# Patient Record
Sex: MALE | Race: WHITE | NOT HISPANIC OR LATINO | Employment: OTHER | URBAN - METROPOLITAN AREA
[De-identification: names, ages, dates, MRNs, and addresses within clinical notes are randomized per-mention and may not be internally consistent; named-entity substitution may affect disease eponyms.]

---

## 2021-05-17 ENCOUNTER — COMMUNICATION - HEALTHEAST (OUTPATIENT)
Dept: SCHEDULING | Facility: CLINIC | Age: 61
End: 2021-05-17

## 2021-05-25 ENCOUNTER — RECORDS - HEALTHEAST (OUTPATIENT)
Dept: ADMINISTRATIVE | Facility: CLINIC | Age: 61
End: 2021-05-25

## 2021-05-26 ENCOUNTER — RECORDS - HEALTHEAST (OUTPATIENT)
Dept: ADMINISTRATIVE | Facility: CLINIC | Age: 61
End: 2021-05-26

## 2021-05-29 ENCOUNTER — RECORDS - HEALTHEAST (OUTPATIENT)
Dept: ADMINISTRATIVE | Facility: CLINIC | Age: 61
End: 2021-05-29

## 2021-06-02 ENCOUNTER — RECORDS - HEALTHEAST (OUTPATIENT)
Dept: ADMINISTRATIVE | Facility: CLINIC | Age: 61
End: 2021-06-02

## 2021-06-09 ENCOUNTER — RECORDS - HEALTHEAST (OUTPATIENT)
Dept: ADMINISTRATIVE | Facility: CLINIC | Age: 61
End: 2021-06-09

## 2021-06-16 PROBLEM — I63.9 ACUTE CEREBROVASCULAR ACCIDENT (CVA) (H): Status: ACTIVE | Noted: 2021-05-17

## 2021-06-16 PROBLEM — I63.9 ACUTE ISCHEMIC STROKE (H): Status: ACTIVE | Noted: 2021-05-16

## 2024-02-22 ENCOUNTER — APPOINTMENT (OUTPATIENT)
Dept: CT IMAGING | Facility: CLINIC | Age: 64
End: 2024-02-22
Attending: EMERGENCY MEDICINE
Payer: COMMERCIAL

## 2024-02-22 ENCOUNTER — HOSPITAL ENCOUNTER (EMERGENCY)
Facility: CLINIC | Age: 64
Discharge: HOME | End: 2024-02-23
Attending: EMERGENCY MEDICINE | Admitting: EMERGENCY MEDICINE
Payer: COMMERCIAL

## 2024-02-22 DIAGNOSIS — R51.9 ACUTE INTRACTABLE HEADACHE, UNSPECIFIED HEADACHE TYPE: ICD-10-CM

## 2024-02-22 LAB
ANION GAP SERPL CALCULATED.3IONS-SCNC: 10 MMOL/L (ref 7–15)
BASOPHILS # BLD AUTO: 0 10E3/UL (ref 0–0.2)
BASOPHILS NFR BLD AUTO: 0 %
BUN SERPL-MCNC: 17.6 MG/DL (ref 8–23)
CALCIUM SERPL-MCNC: 9.6 MG/DL (ref 8.8–10.2)
CHLORIDE SERPL-SCNC: 102 MMOL/L (ref 98–107)
CREAT SERPL-MCNC: 0.93 MG/DL (ref 0.67–1.17)
DEPRECATED HCO3 PLAS-SCNC: 24 MMOL/L (ref 22–29)
EGFRCR SERPLBLD CKD-EPI 2021: >90 ML/MIN/1.73M2
EOSINOPHIL # BLD AUTO: 0.1 10E3/UL (ref 0–0.7)
EOSINOPHIL NFR BLD AUTO: 1 %
ERYTHROCYTE [DISTWIDTH] IN BLOOD BY AUTOMATED COUNT: 13.2 % (ref 10–15)
GLUCOSE SERPL-MCNC: 158 MG/DL (ref 70–99)
HCT VFR BLD AUTO: 33.8 % (ref 40–53)
HGB BLD-MCNC: 11.3 G/DL (ref 13.3–17.7)
IMM GRANULOCYTES # BLD: 0 10E3/UL
IMM GRANULOCYTES NFR BLD: 0 %
LYMPHOCYTES # BLD AUTO: 1 10E3/UL (ref 0.8–5.3)
LYMPHOCYTES NFR BLD AUTO: 14 %
MAGNESIUM SERPL-MCNC: 2.2 MG/DL (ref 1.7–2.3)
MCH RBC QN AUTO: 30.7 PG (ref 26.5–33)
MCHC RBC AUTO-ENTMCNC: 33.4 G/DL (ref 31.5–36.5)
MCV RBC AUTO: 92 FL (ref 78–100)
MONOCYTES # BLD AUTO: 0.8 10E3/UL (ref 0–1.3)
MONOCYTES NFR BLD AUTO: 10 %
NEUTROPHILS # BLD AUTO: 5.5 10E3/UL (ref 1.6–8.3)
NEUTROPHILS NFR BLD AUTO: 75 %
NRBC # BLD AUTO: 0 10E3/UL
NRBC BLD AUTO-RTO: 0 /100
PLATELET # BLD AUTO: 182 10E3/UL (ref 150–450)
POTASSIUM SERPL-SCNC: 4.2 MMOL/L (ref 3.4–5.3)
RBC # BLD AUTO: 3.68 10E6/UL (ref 4.4–5.9)
SODIUM SERPL-SCNC: 136 MMOL/L (ref 135–145)
WBC # BLD AUTO: 7.4 10E3/UL (ref 4–11)

## 2024-02-22 PROCEDURE — 258N000003 HC RX IP 258 OP 636: Performed by: EMERGENCY MEDICINE

## 2024-02-22 PROCEDURE — 70496 CT ANGIOGRAPHY HEAD: CPT

## 2024-02-22 PROCEDURE — 250N000011 HC RX IP 250 OP 636: Performed by: EMERGENCY MEDICINE

## 2024-02-22 PROCEDURE — 96361 HYDRATE IV INFUSION ADD-ON: CPT | Mod: 59

## 2024-02-22 PROCEDURE — 85004 AUTOMATED DIFF WBC COUNT: CPT | Performed by: EMERGENCY MEDICINE

## 2024-02-22 PROCEDURE — 99285 EMERGENCY DEPT VISIT HI MDM: CPT | Mod: 25

## 2024-02-22 PROCEDURE — 250N000013 HC RX MED GY IP 250 OP 250 PS 637: Performed by: EMERGENCY MEDICINE

## 2024-02-22 PROCEDURE — 83735 ASSAY OF MAGNESIUM: CPT | Performed by: EMERGENCY MEDICINE

## 2024-02-22 PROCEDURE — 36415 COLL VENOUS BLD VENIPUNCTURE: CPT | Performed by: EMERGENCY MEDICINE

## 2024-02-22 PROCEDURE — 72125 CT NECK SPINE W/O DYE: CPT

## 2024-02-22 PROCEDURE — 80048 BASIC METABOLIC PNL TOTAL CA: CPT | Performed by: EMERGENCY MEDICINE

## 2024-02-22 RX ORDER — DIPHENHYDRAMINE HYDROCHLORIDE 50 MG/ML
25 INJECTION INTRAMUSCULAR; INTRAVENOUS ONCE
Status: COMPLETED | OUTPATIENT
Start: 2024-02-22 | End: 2024-02-23

## 2024-02-22 RX ORDER — OXYCODONE HYDROCHLORIDE 5 MG/1
5 TABLET ORAL ONCE
Status: COMPLETED | OUTPATIENT
Start: 2024-02-22 | End: 2024-02-22

## 2024-02-22 RX ORDER — METOCLOPRAMIDE HYDROCHLORIDE 5 MG/ML
10 INJECTION INTRAMUSCULAR; INTRAVENOUS ONCE
Status: COMPLETED | OUTPATIENT
Start: 2024-02-22 | End: 2024-02-23

## 2024-02-22 RX ORDER — ACETAMINOPHEN 325 MG/1
975 TABLET ORAL ONCE
Status: COMPLETED | OUTPATIENT
Start: 2024-02-22 | End: 2024-02-22

## 2024-02-22 RX ORDER — IOPAMIDOL 755 MG/ML
75 INJECTION, SOLUTION INTRAVASCULAR ONCE
Status: COMPLETED | OUTPATIENT
Start: 2024-02-23 | End: 2024-02-22

## 2024-02-22 RX ADMIN — SODIUM CHLORIDE 500 ML: 9 INJECTION, SOLUTION INTRAVENOUS at 23:57

## 2024-02-22 RX ADMIN — ACETAMINOPHEN 975 MG: 325 TABLET ORAL at 23:58

## 2024-02-22 RX ADMIN — OXYCODONE HYDROCHLORIDE 5 MG: 5 TABLET ORAL at 23:58

## 2024-02-22 RX ADMIN — IOPAMIDOL 75 ML: 755 INJECTION, SOLUTION INTRAVENOUS at 23:46

## 2024-02-22 ASSESSMENT — ENCOUNTER SYMPTOMS
LIGHT-HEADEDNESS: 0
FACIAL ASYMMETRY: 0
SHORTNESS OF BREATH: 0
DYSURIA: 0
NAUSEA: 1
WEAKNESS: 0
BACK PAIN: 0
HEADACHES: 1
FEVER: 0
NECK PAIN: 1
VOMITING: 0
COUGH: 0
DIZZINESS: 0
NUMBNESS: 0
DIARRHEA: 0
ABDOMINAL PAIN: 0

## 2024-02-22 NOTE — Clinical Note
Neftali Steinberg was seen and treated in our emergency department on 2/22/2024.  He may return to work on 02/26/2024.       If you have any questions or concerns, please don't hesitate to call.      Omaira Baker MD

## 2024-02-23 VITALS
OXYGEN SATURATION: 94 % | SYSTOLIC BLOOD PRESSURE: 120 MMHG | TEMPERATURE: 97 F | HEART RATE: 53 BPM | WEIGHT: 222 LBS | DIASTOLIC BLOOD PRESSURE: 66 MMHG | RESPIRATION RATE: 16 BRPM | BODY MASS INDEX: 33.75 KG/M2

## 2024-02-23 LAB
HOLD SPECIMEN: NORMAL

## 2024-02-23 PROCEDURE — 250N000011 HC RX IP 250 OP 636: Performed by: EMERGENCY MEDICINE

## 2024-02-23 PROCEDURE — 96375 TX/PRO/DX INJ NEW DRUG ADDON: CPT

## 2024-02-23 PROCEDURE — 96374 THER/PROPH/DIAG INJ IV PUSH: CPT

## 2024-02-23 RX ORDER — DEXAMETHASONE SODIUM PHOSPHATE 10 MG/ML
10 INJECTION, SOLUTION INTRAMUSCULAR; INTRAVENOUS ONCE
Status: COMPLETED | OUTPATIENT
Start: 2024-02-23 | End: 2024-02-23

## 2024-02-23 RX ORDER — ONDANSETRON 4 MG/1
4 TABLET, ORALLY DISINTEGRATING ORAL EVERY 8 HOURS PRN
Qty: 10 TABLET | Refills: 0 | Status: SHIPPED | OUTPATIENT
Start: 2024-02-23

## 2024-02-23 RX ADMIN — METOCLOPRAMIDE HYDROCHLORIDE 10 MG: 5 INJECTION INTRAMUSCULAR; INTRAVENOUS at 00:03

## 2024-02-23 RX ADMIN — DIPHENHYDRAMINE HYDROCHLORIDE 25 MG: 50 INJECTION INTRAMUSCULAR; INTRAVENOUS at 00:01

## 2024-02-23 RX ADMIN — DEXAMETHASONE SODIUM PHOSPHATE 10 MG: 10 INJECTION INTRAMUSCULAR; INTRAVENOUS at 01:37

## 2024-02-23 NOTE — ED TRIAGE NOTES
Patient presents to the ED with a headache since Monday. He has been taking ibuprofen without relief. Patient states that he has been getting a sharp shooting pain in his head when he turns his neck a certain direction. Patient reports nausea with the pain. Denies any changes in vision.

## 2024-02-23 NOTE — ED PROVIDER NOTES
EMERGENCY DEPARTMENT ENCOUNTER      NAME: Neftali Steinberg  AGE: 64 year old male  YOB: 1960  MRN: [id number]  EVALUATION DATE & TIME: No admission date for patient encounter.    PCP: Hermila Mckee    ED PROVIDER: Omaira Baker M.D.        Chief Complaint   Patient presents with    Headache         FINAL IMPRESSION:    1. Acute intractable headache, unspecified headache type            MEDICAL DECISION MAKIN year old male with history of stroke back in  who presents emergency department with headache.  He describes the headache as being on the top of his head and even is experiencing some hyperesthesias to the scalp.  This has been going on now for several days.  Neuroexam completely normal.  Laboratories and CT imaging unremarkable.  Scalp exam without any findings for rash, lesions, or other abnormalities.  Nothing to suggest zoster as the pain crosses the midline over his scalp.  Pain is not only external.  He received Reglan, Benadryl, IV fluids, oxycodone with slight improvement in headache.  He was also given a dose of Decadron prior to discharge.  Toradol was offered but patient declined.  No red flags on exam or history at this time.  I do not feel the patient requires emergent admission at this point.  No indication for MRI imaging.  No indication for lumbar puncture.  No reported recent illnesses to suggest risk for developing venous sinus thrombosis.      ED COURSE:  10:50 PM  I met with the patient to gather history and perform my exam. ED course and treatment discussed.    1:38 AM  Updated patient on all results.  He states the headache is still pretty significant though it has improved from arrival.  Neuroexam is completely unremarkable.  Imaging looks great.  Nothing to suggest meningitis or encephalitis.  Considered things like COVID, influenza, RSV.  He has no other symptoms that would be consistent.  He has declined testing for these which I think is reasonable.  He  states he did do a home COVID test that was negative.  No neurologic changes to suggest the need for MRI imaging.  Does not sound like subarachnoid or intracranial hemorrhage type headache.  No red flags on exam or history.  Did discuss with him about trying some Toradol but he declines.  He did agree to try some Decadron before he goes.  Will have him follow-up with primary care early next week if not improving or return to ER if anything worsens.  We discussed what to watch for and when return to the ER and all of his questions have been answered.    At this time I do not think that this represents CVA, TIA, SAH, glaucoma, temporal arteritis, sinus thrombosis, vascular dissection, pseudotumor cerebri, meningitis, enchephalitis, hypertensive urgency or emergency, or other such etiologies.    At the conclusion of the encounter I discussed the results of all of the tests and the disposition. Their questions were answered. The patient (and any family present) acknowledged understanding and were agreeable with the care plan.      CONSULTANTS:  none    MEDICATIONS GIVEN IN THE EMERGENCY:  Medications   oxyCODONE (ROXICODONE) tablet 5 mg (5 mg Oral $Given 2/22/24 2358)   acetaminophen (TYLENOL) tablet 975 mg (975 mg Oral $Given 2/22/24 2358)   metoclopramide (REGLAN) injection 10 mg (10 mg Intravenous $Given 2/23/24 0003)   diphenhydrAMINE (BENADRYL) injection 25 mg (25 mg Intravenous $Given 2/23/24 0001)   sodium chloride 0.9% BOLUS 500 mL (0 mLs Intravenous Stopped 2/23/24 0053)   iopamidol (ISOVUE-370) solution 75 mL (75 mLs Intravenous $Given 2/22/24 2346)   dexAMETHasone PF (DECADRON) injection 10 mg (10 mg Intravenous $Given 2/23/24 0137)           NEW PRESCRIPTIONS STARTED AT TODAY'S ER VISIT     Medication List        Started      ondansetron 4 MG ODT tab  Commonly known as: ZOFRAN ODT  4 mg, Oral, EVERY 8 HOURS PRN                  CONDITION:  stable        DISPOSITION:  D.c home with ride          =================================================================  =================================================================  TRIAGE ASSESSMENT:  Patient presents to the ED with a headache since Monday. He has been taking ibuprofen without relief. Patient states that he has been getting a sharp shooting pain in his head when he turns his neck a certain direction. Patient reports nausea with the pain. Denies any changes in vision.         ED Triage Vitals [02/22/24 2232]   Enc Vitals Group      BP (!) 143/80      Pulse 65      Resp 18      Temp 97  F (36.1  C)      Temp src Temporal      SpO2 97 %      Weight 100.7 kg (222 lb)         ================================================================  ================================================================    HPI    Patient information was obtained from: patient    Use of Intrepreter: N/A     Neftali Steinberg is a 64 year old male with history of CVA who presents to the ER with complaints of headache.    He has had a headache for the past 4 days.  He states it is very constant in nature.  Located on the top of his head.  He also reports that he has skin sensitivity to his scalp as well.  He denies any vision changes, fevers, cough, chest pain, shortness of breath, abdominal pain, diarrhea.  He has had some nausea without vomiting.  He also reports that he has some left-sided neck pain occasionally and if he turns his neck a certain way the pain becomes so severe that he can drop to his knees.  This has been going on now for at least 6 months though he notices some slight increase in those symptoms lately.    He denies any new numbness or weakness in arms or legs.    For the headache he has tried over-the-counter NSAIDs without any relief.  Last dose around 6 PM.          CHART REVIEW:  Chart Review shows that he had a stroke back in May 2021 at which time he was experiencing dizziness and slurred speech.  He also had some facial numbness.      REVIEW  OF SYSTEMS  Review of Systems   Constitutional:  Negative for fever.   Eyes:  Negative for visual disturbance.   Respiratory:  Negative for cough and shortness of breath.    Cardiovascular:  Negative for chest pain.   Gastrointestinal:  Positive for nausea. Negative for abdominal pain, diarrhea and vomiting.   Genitourinary:  Negative for dysuria.   Musculoskeletal:  Positive for neck pain. Negative for back pain.   Skin:  Negative for rash.   Allergic/Immunologic: Negative for immunocompromised state.   Neurological:  Positive for headaches. Negative for dizziness, facial asymmetry, weakness, light-headedness and numbness.   All other systems reviewed and are negative.      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 Authorizing Provider   acetaminophen 500 mg coapsule [ACETAMINOPHEN 500 MG COAPSULE] Take 1,000 mg by mouth every 6 (six) hours as needed. 5/11/21   Provider, Historical   aspirin 81 MG EC tablet [ASPIRIN 81 MG EC TABLET] Take 1 tablet (81 mg total) by mouth daily. 5/18/21   Riaz Maier DO   atenoloL (TENORMIN) 25 MG tablet [ATENOLOL (TENORMIN) 25 MG TABLET] Take 1 tablet (25 mg total) by mouth daily. Take only if BP >130/80 5/18/21   Riaz Maier DO   atorvastatin (LIPITOR) 40 MG tablet [ATORVASTATIN (LIPITOR) 40 MG TABLET] Take 1 tablet (40 mg total) by mouth daily. 5/18/21   Riaz Maier DO   bacitracin 500 unit/gram ointment [BACITRACIN 500 UNIT/GRAM OINTMENT] Apply 1 application topically 2 (two) times a day. Apply to penis tip for catheter discomfort 5/11/21   Provider, Historical   oxyCODONE (ROXICODONE) 5 MG immediate release tablet [OXYCODONE (ROXICODONE) 5 MG IMMEDIATE RELEASE TABLET] Take 5  mg by mouth every 6 (six) hours as needed. 5/11/21   Provider, Historical   tadalafiL (CIALIS) 5 MG tablet [TADALAFIL (CIALIS) 5 MG TABLET] Take 5 mg by mouth daily. 5/11/21   Provider, Historical         ALLERGIES:  No Known Allergies      FAMILY HISTORY:  No family history on file.      SOCIAL HISTORY:  Social History     Socioeconomic History    Marital status:    Tobacco Use    Smoking status: Never   Substance and Sexual Activity    Alcohol use: No    Drug use: No         VITALS:  Patient Vitals for the past 24 hrs:   BP Temp Temp src Pulse Resp SpO2 Weight   02/23/24 0130 120/66 -- -- 53 -- 94 % --   02/23/24 0100 113/63 -- -- 55 -- 93 % --   02/23/24 0053 113/73 -- -- 56 -- 95 % --   02/23/24 0000 (!) 141/76 -- -- 69 -- 96 % --   02/22/24 2358 (!) 145/85 -- -- 64 -- 96 % --   02/22/24 2334 (!) 144/88 -- -- 71 16 96 % --   02/22/24 2232 (!) 143/80 97  F (36.1  C) Temporal 65 18 97 % 100.7 kg (222 lb)       Wt Readings from Last 3 Encounters:   02/22/24 100.7 kg (222 lb)       CrCl cannot be calculated (Unknown ideal weight.).    PHYSICAL EXAM    Constitutional:  Well developed, Well nourished, NAD  HENT:  Normocephalic, Atraumatic, Bilateral external ears normal, Nose normal. Neck- Supple, No stridor. +scalp tenderness without rash or trauma (hyperesthetic)  Eyes:  PERRL, EOMI, Conjunctiva normal, No discharge.  Respiratory:  Normal breath sounds, No respiratory distress, No wheezing, Speaks full sentences easily. No cough.   Cardiovascular:  Normal heart rate, Regular rhythm, No rubs, No gallops.   GI:  No excessive obesity.  Bowel sounds normal, Soft, No tenderness, No masses, No flank tenderness. No rebound or guarding.   : deferred  Musculoskeletal:  No cyanosis, No clubbing. Good range of motion in all major joints. No tenderness to palpation or major deformities noted. No midline tenderness of the CTLS spine.   Integument:  Warm, Dry, No erythema, No rash.  No petechiae.   Neurologic:  Alert &  oriented x 3, Normal motor function, Normal sensory function, No focal deficits noted. Normal gait.  Cranial nerves II-XII grossly intact.  Psychiatric:  Affect normal, Cooperative    National [employer] Stroke Scale  Exam Interval: Baseline   Score    Level of consciousness: (0)   Alert, keenly responsive    LOC questions: (0)   Answers both questions correctly    LOC commands: (0)   Performs both tasks correctly    Best gaze: (0)   Normal    Visual: (0)   No visual loss    Facial palsy: (0)   Normal symmetrical movements    Motor arm (left): (0)   No drift    Motor arm (right): (0)   No drift    Motor leg (left): (0)   No drift    Motor leg (right): (0)   No drift    Limb ataxia: (0)   Absent    Sensory: (0)   Normal- no sensory loss    Best language: (0)   Normal- no aphasia    Dysarthria: (0)   Normal    Extinction and inattention: (0)   No abnormality        Total Score:  0              LAB:  All pertinent labs reviewed and interpreted.  Recent Results (from the past 24 hour(s))   Basic metabolic panel    Collection Time: 02/22/24 10:52 PM   Result Value Ref Range    Sodium 136 135 - 145 mmol/L    Potassium 4.2 3.4 - 5.3 mmol/L    Chloride 102 98 - 107 mmol/L    Carbon Dioxide (CO2) 24 22 - 29 mmol/L    Anion Gap 10 7 - 15 mmol/L    Urea Nitrogen 17.6 8.0 - 23.0 mg/dL    Creatinine 0.93 0.67 - 1.17 mg/dL    GFR Estimate >90 >60 mL/min/1.73m2    Calcium 9.6 8.8 - 10.2 mg/dL    Glucose 158 (H) 70 - 99 mg/dL   Magnesium    Collection Time: 02/22/24 10:52 PM   Result Value Ref Range    Magnesium 2.2 1.7 - 2.3 mg/dL   Extra Blue Top Tube    Collection Time: 02/22/24 10:52 PM   Result Value Ref Range    Hold Specimen JIC    Extra Red Top Tube    Collection Time: 02/22/24 10:52 PM   Result Value Ref Range    Hold Specimen JIC    Extra Green Top (Lithium Heparin) Tube    Collection Time: 02/22/24 10:52 PM   Result Value Ref Range    Hold Specimen JIC    CBC with platelets and differential    Collection Time:  02/22/24 10:52 PM   Result Value Ref Range    WBC Count 7.4 4.0 - 11.0 10e3/uL    RBC Count 3.68 (L) 4.40 - 5.90 10e6/uL    Hemoglobin 11.3 (L) 13.3 - 17.7 g/dL    Hematocrit 33.8 (L) 40.0 - 53.0 %    MCV 92 78 - 100 fL    MCH 30.7 26.5 - 33.0 pg    MCHC 33.4 31.5 - 36.5 g/dL    RDW 13.2 10.0 - 15.0 %    Platelet Count 182 150 - 450 10e3/uL    % Neutrophils 75 %    % Lymphocytes 14 %    % Monocytes 10 %    % Eosinophils 1 %    % Basophils 0 %    % Immature Granulocytes 0 %    NRBCs per 100 WBC 0 <1 /100    Absolute Neutrophils 5.5 1.6 - 8.3 10e3/uL    Absolute Lymphocytes 1.0 0.8 - 5.3 10e3/uL    Absolute Monocytes 0.8 0.0 - 1.3 10e3/uL    Absolute Eosinophils 0.1 0.0 - 0.7 10e3/uL    Absolute Basophils 0.0 0.0 - 0.2 10e3/uL    Absolute Immature Granulocytes 0.0 <=0.4 10e3/uL    Absolute NRBCs 0.0 10e3/uL       Lab Results   Component Value Date    ABORH A POS 05/17/2021           RADIOLOGY:  Reviewed all pertinent imaging. Please see official radiology report.    CT Cervical Spine w/o Contrast   Final Result   IMPRESSION:   1.  No fracture or posttraumatic subluxation.   2.  No high-grade spinal canal or neural foraminal stenosis.      CTA Head Neck with Contrast   Final Result   IMPRESSION:    HEAD CT:   1.  No acute intracranial process.      HEAD CTA:    1.  Normal CTA [place].      NECK CTA:   1.  Normal neck CTA.            EKG:    none      PROCEDURES:  none    Medical Decision Making  Obtained supplemental history:Supplemental history obtained?: No  Reviewed external records: External records reviewed?: Documented in chart and Inpatient Record: see HPI  Care impacted by chronic illness:Cerebrovascular Disease  Care significantly affected by social determinants of health:Access to Medical Care  Did you consider but not order tests?: Work up considered but not performed and documented in chart, if applicable  Did you interpret images independently?: Independent interpretation of ECG and images noted  in documentation, when applicable.  Consultation discussion with other provider:Did you involve another provider (consultant, , pharmacy, etc.)?: No  Discharge. I prescribed additional prescription strength medication(s) as charted. I considered admission, but ultimately discharged patient as imaging is reassuring, laboratories look good, neuroexam completely normal.      Omaira Baker M.D. [facility]  Emergency Medicine and Medical Toxicology  [facility] EMERGENCY ROOM  [address] [id number] [phone]  Dept: [phone]           Omaira Baker MD  02/23/24 0147

## 2024-02-23 NOTE — DISCHARGE INSTRUCTIONS
The CT of your brain looks good.  Your laboratories all look good.  Sometimes headaches can just develop like this and it takes some time for them to get better.  You were given oxycodone, Reglan (nausea vomiting medicine), benadryl (helps with headaches), and decadron (steroid medicine).     You can continue to take [id number] 3 times a day, and ibuprofen 400mg every 6 hours for the headache.    Make an appointment to follow-up with family doctor for Monday if the headache is not improving.    Return to emergency department with worsening headache, fever, body aches, vomiting, new numbness or weakness in arms or legs, or any other concerns.    Thank you for choosing [facility] Emergency [facility].  It has been my pleasure caring for you today.     ~Dr. Samuel MD